# Patient Record
Sex: FEMALE | Race: WHITE | Employment: OTHER | URBAN - METROPOLITAN AREA
[De-identification: names, ages, dates, MRNs, and addresses within clinical notes are randomized per-mention and may not be internally consistent; named-entity substitution may affect disease eponyms.]

---

## 2019-04-03 ENCOUNTER — HOSPITAL ENCOUNTER (OUTPATIENT)
Age: 69
Discharge: HOME | End: 2019-04-05
Payer: MEDICARE

## 2019-04-03 ENCOUNTER — HOSPITAL ENCOUNTER (OUTPATIENT)
Dept: GENERAL RADIOLOGY | Age: 69
Discharge: HOME | End: 2019-04-05
Payer: MEDICARE

## 2019-04-03 DIAGNOSIS — R06.02 SHORTNESS OF BREATH: ICD-10-CM

## 2019-04-03 PROCEDURE — 71046 X-RAY EXAM CHEST 2 VIEWS: CPT

## 2019-08-19 ENCOUNTER — OFFICE VISIT (OUTPATIENT)
Dept: PULMONOLOGY | Age: 69
End: 2019-08-19
Payer: MEDICARE

## 2019-08-19 VITALS
HEIGHT: 62 IN | RESPIRATION RATE: 18 BRPM | OXYGEN SATURATION: 97 % | DIASTOLIC BLOOD PRESSURE: 60 MMHG | WEIGHT: 179 LBS | BODY MASS INDEX: 32.94 KG/M2 | SYSTOLIC BLOOD PRESSURE: 130 MMHG | HEART RATE: 85 BPM

## 2019-08-19 DIAGNOSIS — M34.9 SCLERODERMA (HCC): Primary | ICD-10-CM

## 2019-08-19 LAB
EXPIRATORY TIME-POST: NORMAL SEC
EXPIRATORY TIME: NORMAL SEC
FEF 25-75% %CHNG: NORMAL
FEF 25-75% %PRED-POST: NORMAL %
FEF 25-75% %PRED-PRE: NORMAL L/SEC
FEF 25-75% PRED: NORMAL L/SEC
FEF 25-75%-POST: NORMAL L/SEC
FEF 25-75%-PRE: NORMAL L/SEC
FEV1 %PRED-POST: 82 %
FEV1 %PRED-PRE: 79 %
FEV1 PRED: 2.04 L
FEV1-POST: 1.68 L
FEV1-PRE: 1.62 L
FEV1/FVC %PRED-POST: 100 %
FEV1/FVC %PRED-PRE: 91 %
FEV1/FVC PRED: 78 %
FEV1/FVC-POST: 78 %
FEV1/FVC-PRE: 72 %
FVC %PRED-POST: 81 L
FVC %PRED-PRE: 85 %
FVC PRED: 2.63 L
FVC-POST: 2.15 L
FVC-PRE: 2.26 L
PEF %PRED-POST: NORMAL %
PEF %PRED-PRE: NORMAL L/SEC
PEF PRED: NORMAL L/SEC
PEF%CHNG: NORMAL
PEF-POST: NORMAL L/SEC
PEF-PRE: NORMAL L/SEC

## 2019-08-19 PROCEDURE — 99204 OFFICE O/P NEW MOD 45 MIN: CPT | Performed by: INTERNAL MEDICINE

## 2019-08-19 PROCEDURE — 1123F ACP DISCUSS/DSCN MKR DOCD: CPT | Performed by: INTERNAL MEDICINE

## 2019-08-19 PROCEDURE — 4040F PNEUMOC VAC/ADMIN/RCVD: CPT | Performed by: INTERNAL MEDICINE

## 2019-08-19 PROCEDURE — 99203 OFFICE O/P NEW LOW 30 MIN: CPT | Performed by: INTERNAL MEDICINE

## 2019-08-19 PROCEDURE — 1090F PRES/ABSN URINE INCON ASSESS: CPT | Performed by: INTERNAL MEDICINE

## 2019-08-19 PROCEDURE — 94060 EVALUATION OF WHEEZING: CPT | Performed by: INTERNAL MEDICINE

## 2019-08-19 PROCEDURE — 3017F COLORECTAL CA SCREEN DOC REV: CPT | Performed by: INTERNAL MEDICINE

## 2019-08-19 PROCEDURE — G8400 PT W/DXA NO RESULTS DOC: HCPCS | Performed by: INTERNAL MEDICINE

## 2019-08-19 PROCEDURE — G8427 DOCREV CUR MEDS BY ELIG CLIN: HCPCS | Performed by: INTERNAL MEDICINE

## 2019-08-19 PROCEDURE — 1036F TOBACCO NON-USER: CPT | Performed by: INTERNAL MEDICINE

## 2019-08-19 PROCEDURE — G8598 ASA/ANTIPLAT THER USED: HCPCS | Performed by: INTERNAL MEDICINE

## 2019-08-19 PROCEDURE — G8417 CALC BMI ABV UP PARAM F/U: HCPCS | Performed by: INTERNAL MEDICINE

## 2019-08-19 ASSESSMENT — PULMONARY FUNCTION TESTS
FVC_PERCENT_PREDICTED_PRE: 85
FEV1_PERCENT_PREDICTED_PRE: 79
FEV1_PERCENT_PREDICTED_POST: 82
FEV1/FVC_PRE: 72
FEV1/FVC_PERCENT_PREDICTED_POST: 100
FEV1/FVC_PREDICTED: 78
FEV1/FVC_POST: 78
FVC_POST: 2.15
FEV1_POST: 1.68
FVC_PRE: 2.26
FVC_PREDICTED: 2.63
FVC_PERCENT_PREDICTED_POST: 81
FEV1_PREDICTED: 2.04
FEV1_PRE: 1.62
FEV1/FVC_PERCENT_PREDICTED_PRE: 91

## 2019-08-19 NOTE — PROGRESS NOTES
 JOINT REPLACEMENT Right     Knee       FAMILY HISTORY:   Lung cancer:No   DVT or PE  No     REVIEW OF SYSTEMS:  Constitutional: General health is good . There has been no weight changes. No fevers, fatigue or weakness. Head: Patient denies any history of trauma, convulsive disorder or syncope. Skin:  Patient denies history of changes in pigmentation, eruptions or pruritus. No easy bruising or bleeding. EENT: no nasal congestion   Cardiovascular ,No chest pain ,No edema ,  Respiration:SOB: + ,BRYANT :+  Gastrointestinal:No GI bleed ,no melena  ,no hematemesis    Musculoskeletal: no joint pain ,no swelling  Neurological:no , syncope. Denies twitching, convulsions, loss of consciousness or memory. Endocrine:  . No history of goiter, exophthalmos or dryness of skin. The patient has no history of diabetes. Hematopoietic:  No history of bleeding disorders or easy bruising. Rheumatic:  No connective tissue disease history or polyarthritis/inflammatory joint disease. PHYSICAL EXAMINATION:  Vitals:    08/19/19 1417   BP: 130/60   Pulse: 85   Resp: 18   SpO2: 97%     Constitutional: This is a well developed, well nourished 77y.o. year old female who is alert, oriented, cooperative and in no apparent distress. Head was normocephalic and atraumatic. EENT: Mallampati class :  Extraocular muscles intact. External canals are patent and no discharge was appreciated. Septum was midline,   mucosa was without erythema, exudates or cobblestoning. No thrush was noted. Neck: Supple without thyromegaly. No elevated JVP. Trachea was midline. No carotid bruits were auscultated. Respiratory: rhonchi     Cardiovascular: Regular without murmur, clicks, gallops or rubs. There is no left or right ventricular heave. Pulses:  Carotid, radial and femoral pulses were equally bilaterally. Abdomen: Slightly rounded and soft without organomegaly. No rebound, rigidity or guarding was appreciated. Lymphatic: No lymphadenopathy. Musculoskeletal: joint pain ,hip   Extremities: no edema     Skin:  Warm and dry. Good color, turgor and pigmentation. No lesions or scars. Neurological/Psychiatric: The patient's general behavior, level of consciousness, thought content and emotional status is normal.  Cranial nerves II-XII are intact. DATA:  FVC 2016 was 2.64 which 92 %   FEV1 1.63 which 95 %   FEV1/FVC 78      Today 8/19     FVC 2.26 which is 85 %  FEV /FVC 72  FEv1 1.62 which 79             IMPRESSION:    1-scleodrma  2-Possible restrictive lung disease   3-possible GEOFF  4-SOB             PLAN:     Patient with scleroderma, her ambulation in the office shows that her oxygen remained above 90, she has mild shortness of breath, we will do the further work-up to see if she developed any interstitial lung disease or pulmonary hypertension that can associate with scleroderma  We will proceed with the following  -2 d echo   - HRCT   -will need records from Dr Mary Beth Hernandez  _ hold for sleep study   _ check Eosinophilic ,sed rate and IGE     Flu and Pneumovax     Thank you for allowing me to participate in [facility]. I will keep following with you ,should you have any concerns ,please contact me at [phone]     Sincerely,        Melinda Amaya MD  Pulmonary & Critical Care Medicine     NOTE: This report was transcribed using voice recognition software. Every effort was made to ensure accuracy; however, inadvertent computerized transcription errors may be present.

## 2019-08-22 DIAGNOSIS — M34.9 SCLERODERMA (HCC): Primary | ICD-10-CM

## 2019-08-22 DIAGNOSIS — I27.89 OTHER SPECIFIED PULMONARY HEART DISEASES (HCC): ICD-10-CM

## 2019-08-27 ENCOUNTER — OFFICE VISIT (OUTPATIENT)
Dept: RHEUMATOLOGY | Age: 69
End: 2019-08-27
Payer: MEDICARE

## 2019-08-27 VITALS
WEIGHT: 189 LBS | HEIGHT: 62 IN | SYSTOLIC BLOOD PRESSURE: 120 MMHG | OXYGEN SATURATION: 97 % | DIASTOLIC BLOOD PRESSURE: 64 MMHG | HEART RATE: 74 BPM | BODY MASS INDEX: 34.78 KG/M2

## 2019-08-27 DIAGNOSIS — M34.9 SCLERODERMA (HCC): Primary | ICD-10-CM

## 2019-08-27 DIAGNOSIS — M16.10 HIP ARTHRITIS: ICD-10-CM

## 2019-08-27 DIAGNOSIS — I73.00 RAYNAUD'S DISEASE WITHOUT GANGRENE: ICD-10-CM

## 2019-08-27 PROCEDURE — 99214 OFFICE O/P EST MOD 30 MIN: CPT | Performed by: INTERNAL MEDICINE

## 2019-08-27 ASSESSMENT — ENCOUNTER SYMPTOMS: SHORTNESS OF BREATH: 1

## 2019-08-27 NOTE — PROGRESS NOTES
19    Name: Citlali Gonzalez  : 1950  Age: 77 y.o. Sex: female    Patient is seen at the request of Radha Baig MD    Patient presents for a rheumatology consultation regarding     Chief Complaint   Patient presents with   [facility] ED Follow-up    Hip Pain     both hip pain across back pain    Foot Pain     right foot       18:  Patient presents for follow-up. Overall she is doing well. Denies fever, chills, heartburn, chest pain,  shortness of breath, abdominal pain, nausea, vomiting, diarrhea. She reports calcinosis over the right knee. No active or draining lesions. Dr Jozef Sanchez has retired. She has not followed with new Pulmonologist yet. Exam. Oral mucosa with telangiectasias. Multiple telangiectasias affecting palms of both hands. Multiple  joints were examined and no active synovitis noted. She has chronic swelling of right lower extremity  along with right ankle swelling. No tenderness on palpation. Diffuse calcinosis over bilateral arms and  bilateral knees    19 :  Pain in hips   Following with Pulmonologist , Dr Spenser De La Cruz regarding shortness of breath and further work up for ILD       Vitals:    19 1453   BP: 120/64   Site: Left Upper Arm   Position: Sitting   Cuff Size: Large Adult   Pulse: 74   SpO2: 97%   Weight: 189 lb (85.7 kg)   Height: 5' 2\" (1.575 m)        Review of Systems   Respiratory: Positive for shortness of breath.            Current Outpatient Medications:     benazepril (LOTENSIN) 20 MG tablet, take 1 tablet by mouth twice a day, Disp: 60 tablet, Rfl: 0    levothyroxine (SYNTHROID) 100 MCG tablet, take 1 tablet by mouth once daily, Disp: 90 tablet, Rfl: 0    amLODIPine (NORVASC) 5 MG tablet, TAKE 1 TABLET BY MOUTH TWICE DAILY, Disp: 60 tablet, Rfl: 1    buPROPion (WELLBUTRIN XL) 150 MG extended release tablet, Take 1 tablet by mouth every morning, Disp: 90 tablet, Rfl: 1    busPIRone (BUSPAR) 10 MG tablet, take 1 tablet by mouth three times a day if needed

## 2019-09-04 ENCOUNTER — TELEPHONE (OUTPATIENT)
Dept: RHEUMATOLOGY | Age: 69
End: 2019-09-04

## 2019-09-06 ENCOUNTER — HOSPITAL ENCOUNTER (OUTPATIENT)
Age: 69
Discharge: HOME | End: 2019-09-06
Payer: MEDICARE

## 2019-09-06 DIAGNOSIS — M34.9 SCLERODERMA (HCC): ICD-10-CM

## 2019-09-06 LAB
EOSINOPHILS ABSOLUTE COUNT: 240 /UL (ref 50–250)
SEDIMENTATION RATE, ERYTHROCYTE: 45 MM/HR (ref 0–20)

## 2019-09-06 PROCEDURE — 82785 ASSAY OF IGE: CPT

## 2019-09-06 PROCEDURE — 36415 COLL VENOUS BLD VENIPUNCTURE: CPT

## 2019-09-06 PROCEDURE — 86003 ALLG SPEC IGE CRUDE XTRC EA: CPT

## 2019-09-06 PROCEDURE — 85048 AUTOMATED LEUKOCYTE COUNT: CPT

## 2019-09-06 PROCEDURE — 85651 RBC SED RATE NONAUTOMATED: CPT

## 2019-09-18 LAB
Lab: NORMAL
REPORT: NORMAL
THIS TEST SENT TO: NORMAL

## 2019-10-08 ENCOUNTER — TELEPHONE (OUTPATIENT)
Dept: PULMONOLOGY | Age: 69
End: 2019-10-08

## 2019-10-08 DIAGNOSIS — M34.9 SCLERODERMA (HCC): Primary | ICD-10-CM

## 2019-11-04 ENCOUNTER — HOSPITAL ENCOUNTER (OUTPATIENT)
Dept: NON INVASIVE DIAGNOSTICS | Age: 69
Discharge: HOME | End: 2019-11-04
Payer: MEDICARE

## 2019-11-04 ENCOUNTER — HOSPITAL ENCOUNTER (OUTPATIENT)
Dept: CT IMAGING | Age: 69
Discharge: HOME | End: 2019-11-06
Payer: MEDICARE

## 2019-11-04 ENCOUNTER — HOSPITAL ENCOUNTER (OUTPATIENT)
Age: 69
Discharge: HOME | End: 2019-11-04
Payer: MEDICARE

## 2019-11-04 DIAGNOSIS — M34.9 SCLERODERMA (HCC): ICD-10-CM

## 2019-11-04 DIAGNOSIS — I27.89 OTHER SPECIFIED PULMONARY HEART DISEASES (HCC): ICD-10-CM

## 2019-11-04 LAB
BUN BLDV-MCNC: 14 MG/DL (ref 8–23)
CREAT SERPL-MCNC: 0.8 MG/DL (ref 0.5–1)
GFR AFRICAN AMERICAN: >60
GFR NON-AFRICAN AMERICAN: >60 ML/MIN/1.73
LV EF: 65 %
LVEF MODALITY: NORMAL

## 2019-11-04 PROCEDURE — 36415 COLL VENOUS BLD VENIPUNCTURE: CPT

## 2019-11-04 PROCEDURE — 82565 ASSAY OF CREATININE: CPT

## 2019-11-04 PROCEDURE — 93306 TTE W/DOPPLER COMPLETE: CPT

## 2019-11-04 PROCEDURE — 84520 ASSAY OF UREA NITROGEN: CPT

## 2019-11-04 PROCEDURE — 71250 CT THORAX DX C-: CPT

## 2019-11-18 ENCOUNTER — OFFICE VISIT (OUTPATIENT)
Dept: PULMONOLOGY | Age: 69
End: 2019-11-18
Payer: MEDICARE

## 2019-11-18 VITALS — RESPIRATION RATE: 14 BRPM | HEART RATE: 78 BPM | SYSTOLIC BLOOD PRESSURE: 118 MMHG | DIASTOLIC BLOOD PRESSURE: 71 MMHG

## 2019-11-18 DIAGNOSIS — M34.9 SCLERODERMA (HCC): ICD-10-CM

## 2019-11-18 PROCEDURE — 99213 OFFICE O/P EST LOW 20 MIN: CPT | Performed by: INTERNAL MEDICINE

## 2019-11-18 PROCEDURE — 1123F ACP DISCUSS/DSCN MKR DOCD: CPT | Performed by: INTERNAL MEDICINE

## 2019-11-18 PROCEDURE — 4040F PNEUMOC VAC/ADMIN/RCVD: CPT | Performed by: INTERNAL MEDICINE

## 2019-11-18 PROCEDURE — G8417 CALC BMI ABV UP PARAM F/U: HCPCS | Performed by: INTERNAL MEDICINE

## 2019-11-18 PROCEDURE — G8598 ASA/ANTIPLAT THER USED: HCPCS | Performed by: INTERNAL MEDICINE

## 2019-11-18 PROCEDURE — 1036F TOBACCO NON-USER: CPT | Performed by: INTERNAL MEDICINE

## 2019-11-18 PROCEDURE — 1090F PRES/ABSN URINE INCON ASSESS: CPT | Performed by: INTERNAL MEDICINE

## 2019-11-18 PROCEDURE — G8400 PT W/DXA NO RESULTS DOC: HCPCS | Performed by: INTERNAL MEDICINE

## 2019-11-18 PROCEDURE — G8427 DOCREV CUR MEDS BY ELIG CLIN: HCPCS | Performed by: INTERNAL MEDICINE

## 2019-11-18 PROCEDURE — 99214 OFFICE O/P EST MOD 30 MIN: CPT | Performed by: INTERNAL MEDICINE

## 2019-11-18 PROCEDURE — 3017F COLORECTAL CA SCREEN DOC REV: CPT | Performed by: INTERNAL MEDICINE

## 2019-11-18 PROCEDURE — G8482 FLU IMMUNIZE ORDER/ADMIN: HCPCS | Performed by: INTERNAL MEDICINE

## 2020-11-16 ENCOUNTER — HOSPITAL ENCOUNTER (OUTPATIENT)
Age: 70
Discharge: HOME | End: 2020-11-18

## 2020-11-16 PROCEDURE — 88305 TISSUE EXAM BY PATHOLOGIST: CPT

## 2020-11-16 PROCEDURE — 88311 DECALCIFY TISSUE: CPT

## 2021-03-29 ENCOUNTER — OFFICE VISIT (OUTPATIENT)
Dept: CARDIOLOGY CLINIC | Age: 71
End: 2021-03-29
Payer: MEDICARE

## 2021-03-29 VITALS
BODY MASS INDEX: 30.93 KG/M2 | HEIGHT: 61 IN | WEIGHT: 163.8 LBS | HEART RATE: 69 BPM | RESPIRATION RATE: 16 BRPM | SYSTOLIC BLOOD PRESSURE: 128 MMHG | DIASTOLIC BLOOD PRESSURE: 68 MMHG

## 2021-03-29 DIAGNOSIS — E78.49 OTHER HYPERLIPIDEMIA: ICD-10-CM

## 2021-03-29 DIAGNOSIS — I10 ESSENTIAL HYPERTENSION: ICD-10-CM

## 2021-03-29 DIAGNOSIS — I25.10 CORONARY ARTERY DISEASE INVOLVING NATIVE CORONARY ARTERY OF NATIVE HEART WITHOUT ANGINA PECTORIS: ICD-10-CM

## 2021-03-29 PROCEDURE — 1090F PRES/ABSN URINE INCON ASSESS: CPT | Performed by: INTERNAL MEDICINE

## 2021-03-29 PROCEDURE — 4040F PNEUMOC VAC/ADMIN/RCVD: CPT | Performed by: INTERNAL MEDICINE

## 2021-03-29 PROCEDURE — G8417 CALC BMI ABV UP PARAM F/U: HCPCS | Performed by: INTERNAL MEDICINE

## 2021-03-29 PROCEDURE — G8427 DOCREV CUR MEDS BY ELIG CLIN: HCPCS | Performed by: INTERNAL MEDICINE

## 2021-03-29 PROCEDURE — 93000 ELECTROCARDIOGRAM COMPLETE: CPT | Performed by: INTERNAL MEDICINE

## 2021-03-29 PROCEDURE — G8400 PT W/DXA NO RESULTS DOC: HCPCS | Performed by: INTERNAL MEDICINE

## 2021-03-29 PROCEDURE — G8484 FLU IMMUNIZE NO ADMIN: HCPCS | Performed by: INTERNAL MEDICINE

## 2021-03-29 PROCEDURE — 1123F ACP DISCUSS/DSCN MKR DOCD: CPT | Performed by: INTERNAL MEDICINE

## 2021-03-29 PROCEDURE — 3017F COLORECTAL CA SCREEN DOC REV: CPT | Performed by: INTERNAL MEDICINE

## 2021-03-29 PROCEDURE — 99204 OFFICE O/P NEW MOD 45 MIN: CPT | Performed by: INTERNAL MEDICINE

## 2021-03-29 PROCEDURE — 1036F TOBACCO NON-USER: CPT | Performed by: INTERNAL MEDICINE

## 2021-03-29 RX ORDER — ATORVASTATIN CALCIUM 10 MG/1
TABLET, FILM COATED ORAL
COMMUNITY
Start: 2021-01-22

## 2021-03-29 RX ORDER — BENAZEPRIL HYDROCHLORIDE 40 MG/1
TABLET, FILM COATED ORAL
COMMUNITY
Start: 2021-03-20

## 2021-03-29 NOTE — PROGRESS NOTES
Chief Complaint   Patient presents with    Coronary Artery Disease    Hyperlipidemia       Patient Active Problem List    Diagnosis Date Noted    Coronary artery disease involving native coronary artery of native heart without angina pectoris 03/29/2021     Overview Note:     A. STEMI 2008 : AYSHA in prox RCA  B. Echo 2019 normal EF      Other hyperlipidemia 03/29/2021    Hip arthritis 08/27/2019    Depression 02/29/2016    Vitamin D deficiency 02/29/2016    Scler[address]a ([facility] [address]     Raynaud's disease     Hypertension     Hypothyroidism        Current Outpatient Medications   Medication Sig Dispense Refill    atorvastatin (LIPITOR) 10 MG tablet       benazepril (LOTENSIN) 40 MG tablet       carvedilol (COREG) 25 MG tablet Take 1 tablet by mouth 2 times daily 180 tablet 0    minocycline (MINOCIN;DYNACIN) 100 MG capsule TAKE 1 CAPSULE BY MOUTH TWICE DAILY 180 capsule 0    amLODIPine (NORVASC) 5 MG tablet TAKE 1 TABLET BY MOUTH TWICE DAILY 60 tablet 0    buPROPion (WELLBUTRIN XL) 150 MG extended release tablet Take 1 tablet by mouth every morning 90 tablet 1    busPIRone (BUSPAR) 10 MG tablet TAKE 1 TABLET BY MOUTH 3 TIMES A DAY IF NEEDED FOR ANXIETY 90 tablet 0    levothyroxine (SYNTHROID) 100 MCG tablet take 1 tablet by mouth once daily 90 tablet 1    NITROSTAT 0.4 MG SL tablet USE AS DIRECTED 25 tablet 0    triamcinolone (KENALOG) 0.1 % ointment APPLY BID TO RASH 1 Tube 3      30 tablet 3    aspirin 325 MG EC tablet Take 325 mg by mouth every 6 hours as needed for Pain       No current facility-administered medications for this visit.          Allergies   Allergen Reactions    Aleve [Naproxen Sodium]     Diazepam     Furosemide     Sulfa Antibiotics        Vitals:    03/29/21 1017   BP: 128/68   Pulse: 69   Resp: 16   Weight: 163 lb 12.8 oz (74.3 kg)   Height: 5' 1\" (1.549 m)       Social History     Socioeconomic History    Marital status:      Spouse name: Not on file    Number of heart beat, near-syncope, orthopnea, palpitations, paroxysmal nocturnal dyspnea, syncope and tachypnea. Not taking ASA,  Did not reduce amlodipine dose to combat lower ext edema as advised by PCP. Had a rash with furosemide  Her  is an invalid and she does all the chores and shopping. Review of Systems:   Heart: as above   Lungs: as above   Eyes: denies changes in vision or discharge. Ears: denies changes in hearing or pain. Nose: denies epistaxis or masses   Throat: denies sore throat or trouble swallowing. Neuro: denies numbness, tingling, tremors. Skin: denies rashes or itching. : denies hematuria, dysuria   GI: denies vomiting, diarrhea   Psych: denies mood changed, anxiety, depression. all others negative. Physical Exam   /68   Pulse 69   Resp 16   Ht 5' 1\" (1.549 m)   Wt 163 lb 12.8 oz (74.3 kg)   BMI 30.95 kg/m²   Constitutional: Oriented to person, place, and time. Slow of speech and movement. Well-developed and well-nourished. No distress. Head: Normocephalic and atraumatic. Eyes: EOM are normal. Pupils are equal, round, and reactive to light. Neck: Normal range of motion. Neck supple. No hepatojugular reflux and no JVD present. Carotid bruit is not present. No tracheal deviation present. No thyromegaly present. Cardiovascular: Normal rate, regular rhythm, normal heart sounds and intact distal pulses. Exam reveals no gallop and no friction rub. No murmur heard. Pulmonary/Chest: Effort normal and breath sounds normal. No respiratory distress. No wheezes. No rales. No tenderness. Abdominal: Soft. Bowel sounds are normal. No distension and no mass. No tenderness. No rebound and no guarding. Musculoskeletal:  1+ bilateral  edema and no tenderness. Neurological: Alert and oriented to person, place, and time. Skin: Skin is warm and dry. No rash noted. Not diaphoretic. No erythema. Psychiatric: Normal mood and affect.  Behavior is normal.     EKG: normal sinus rhythm, rate 69 , axis +81, remote anterior MI pattern, improved from 2017. ASSESSMENT AND PLAN:  Patient Active Problem List   Diagnosis    Hypothyroidism    Scleroderma ([address]    Raynaud's disease    Hypertension    Depression    Vitamin D deficiency    Hip arthritis    Coronary artery disease involving native coronary artery of native heart without angina pectoris    Other hyperlipidemia     Elderly female with hx of remote RCA infarct with no recurrence of symptoms. Echo in 2019 showed normal LV function and no PHTN (has connective tissue disease)  Ekg, if anything, shows improved anterior forces  Exam unremarkable for valvular heart disease or chf  Her pedal edema is possibly due to amlodipine, and I re-emphasized she do what PCP asked her to do, that being reduce dose to 5 mg  She could even eliminate it if need be. She claims reaction to furosemide (has sulfa allergy). I have never seen that before, but it is a real phenomenom - can use ethacrynic acid as diuretic if need be  Asked to take low dose ASA qd. No testing needed        The patient was educated as to the symptoms that would require a prompt return to our office. Return visit in 1 year.     Marylu Cowden, M.D  [facility] Cardiology

## 2021-05-13 ENCOUNTER — HOSPITAL ENCOUNTER (OUTPATIENT)
Age: 71
Discharge: HOME | End: 2021-05-13
Payer: MEDICARE

## 2021-05-13 ENCOUNTER — HOSPITAL ENCOUNTER (OUTPATIENT)
Dept: CT IMAGING | Age: 71
Discharge: HOME | End: 2021-05-15
Payer: MEDICARE

## 2021-05-13 DIAGNOSIS — M34.1 CR(E)ST SYNDROME (HCC): ICD-10-CM

## 2021-05-13 DIAGNOSIS — M34.1 CRST SYNDROME (HCC): ICD-10-CM

## 2021-05-13 LAB
ANION GAP SERPL CALCULATED.3IONS-SCNC: 12 MMOL/L (ref 7–16)
BUN BLDV-MCNC: 13 MG/DL (ref 6–23)
CALCIUM SERPL-MCNC: 9 MG/DL (ref 8.6–10.2)
CHLORIDE BLD-SCNC: 106 MMOL/L (ref 98–107)
CO2: 25 MMOL/L (ref 22–29)
CREAT SERPL-MCNC: 0.6 MG/DL (ref 0.5–1)
GFR AFRICAN AMERICAN: >60
GFR NON-AFRICAN AMERICAN: >60 ML/MIN/1.73
GLUCOSE BLD-MCNC: 86 MG/DL (ref 74–99)
POTASSIUM SERPL-SCNC: 4.5 MMOL/L (ref 3.5–5)
SODIUM BLD-SCNC: 143 MMOL/L (ref 132–146)

## 2021-05-13 PROCEDURE — 80048 BASIC METABOLIC PNL TOTAL CA: CPT

## 2021-05-13 PROCEDURE — 71250 CT THORAX DX C-: CPT

## 2021-05-13 PROCEDURE — 71260 CT THORAX DX C+: CPT

## 2021-05-13 PROCEDURE — 6360000004 HC RX CONTRAST MEDICATION: Performed by: RADIOLOGY

## 2021-05-13 PROCEDURE — 36415 COLL VENOUS BLD VENIPUNCTURE: CPT

## 2021-05-13 PROCEDURE — 2580000003 HC RX 258: Performed by: RADIOLOGY

## 2021-05-13 RX ORDER — SODIUM CHLORIDE 0.9 % (FLUSH) 0.9 %
10 SYRINGE (ML) INJECTION PRN
Status: DISCONTINUED | OUTPATIENT
Start: 2021-05-13 | End: 2021-05-16 | Stop reason: HOSPADM

## 2021-05-13 RX ADMIN — Medication 10 ML: at 15:11

## 2021-05-13 RX ADMIN — IOPAMIDOL 90 ML: 755 INJECTION, SOLUTION INTRAVENOUS at 15:10

## 2021-07-15 ENCOUNTER — TELEPHONE (OUTPATIENT)
Dept: ADMINISTRATIVE | Age: 71
End: 2021-07-15

## 2021-07-15 NOTE — TELEPHONE ENCOUNTER
Matti Johnson states pt would like an appt to see Dr. Dianne Paulson.  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 She was seen recently by Dr. Arleen Ramos. Pt was seen by Dr. Dianne Paulson in the distant past (2017) and has been taking care of her  with Alzheimer's and her son is now helping her keep track of her medical appts.  Please call Matti Johnson if pt can be scheduled with Dr. Dianne Paulson.

## 2021-07-19 DIAGNOSIS — R06.02 SOB (SHORTNESS OF BREATH): Primary | ICD-10-CM

## 2021-08-11 ENCOUNTER — HOSPITAL ENCOUNTER (OUTPATIENT)
Dept: CARDIOLOGY | Age: 71
Discharge: HOME | End: 2021-08-11
Payer: MEDICARE

## 2021-08-11 DIAGNOSIS — R06.02 SOB (SHORTNESS OF BREATH): ICD-10-CM

## 2021-08-11 LAB
LV EF: 60 %
LVEF MODALITY: NORMAL

## 2021-08-11 PROCEDURE — 93306 TTE W/DOPPLER COMPLETE: CPT | Performed by: PSYCHIATRY & NEUROLOGY

## 2021-08-13 ENCOUNTER — TELEPHONE (OUTPATIENT)
Dept: CARDIOLOGY CLINIC | Age: 71
End: 2021-08-13

## 2021-08-13 NOTE — TELEPHONE ENCOUNTER
----- Message from Constanza Penn MD sent at 8/13/2021 12:10 PM EDT -----   Normal ventricular function. Mild mitral regurgitation.   Continue current care

## 2021-08-27 ENCOUNTER — OFFICE VISIT (OUTPATIENT)
Dept: CARDIOLOGY CLINIC | Age: 71
End: 2021-08-27
Payer: MEDICARE

## 2021-08-27 VITALS
WEIGHT: 153.7 LBS | RESPIRATION RATE: 16 BRPM | HEART RATE: 66 BPM | BODY MASS INDEX: 28.29 KG/M2 | DIASTOLIC BLOOD PRESSURE: 62 MMHG | HEIGHT: 62 IN | SYSTOLIC BLOOD PRESSURE: 106 MMHG

## 2021-08-27 DIAGNOSIS — I25.10 CORONARY ARTERY DISEASE INVOLVING NATIVE CORONARY ARTERY OF NATIVE HEART WITHOUT ANGINA PECTORIS: ICD-10-CM

## 2021-08-27 DIAGNOSIS — I10 ESSENTIAL HYPERTENSION: Primary | ICD-10-CM

## 2021-08-27 DIAGNOSIS — E78.49 OTHER HYPERLIPIDEMIA: ICD-10-CM

## 2021-08-27 DIAGNOSIS — R06.02 SOB (SHORTNESS OF BREATH): ICD-10-CM

## 2021-08-27 PROCEDURE — 3017F COLORECTAL CA SCREEN DOC REV: CPT | Performed by: INTERNAL MEDICINE

## 2021-08-27 PROCEDURE — 93000 ELECTROCARDIOGRAM COMPLETE: CPT | Performed by: INTERNAL MEDICINE

## 2021-08-27 PROCEDURE — 4040F PNEUMOC VAC/ADMIN/RCVD: CPT | Performed by: INTERNAL MEDICINE

## 2021-08-27 PROCEDURE — 1123F ACP DISCUSS/DSCN MKR DOCD: CPT | Performed by: INTERNAL MEDICINE

## 2021-08-27 PROCEDURE — 1036F TOBACCO NON-USER: CPT | Performed by: INTERNAL MEDICINE

## 2021-08-27 PROCEDURE — G8400 PT W/DXA NO RESULTS DOC: HCPCS | Performed by: INTERNAL MEDICINE

## 2021-08-27 PROCEDURE — G8427 DOCREV CUR MEDS BY ELIG CLIN: HCPCS | Performed by: INTERNAL MEDICINE

## 2021-08-27 PROCEDURE — G8417 CALC BMI ABV UP PARAM F/U: HCPCS | Performed by: INTERNAL MEDICINE

## 2021-08-27 PROCEDURE — 99214 OFFICE O/P EST MOD 30 MIN: CPT | Performed by: INTERNAL MEDICINE

## 2021-08-27 PROCEDURE — 1090F PRES/ABSN URINE INCON ASSESS: CPT | Performed by: INTERNAL MEDICINE

## 2021-08-27 NOTE — PROGRESS NOTES
Date of Service: 8/27/2021    CHIEF COMPLAINT: Follow-up for CAD    HPI:  Previously followed by Dr. Cassie Patricio. She requested to follow with me since her  follows with me as well (she established care with me in 11/2016). No new cardiac complaints since last cardiology evaluation. Negative Lexiscan nuclear stress test in 3/2016. She maintains, \"I feel good\" (although she complained of CP to her PCP recently). She denies recent chest pain, SOB, palpitations, PND, orthopnea, or syncope. ++increased stress at home ( in Hospice -- recently diagnosed with liver cancer). Patient with a prior history of CAD having had an acute inferior MI 2008 with stent. She was noted to have an 85% circumflex medially managed (small caliber) and 35-40% LAD. Stress 6/21/12 was negative with LVEF 67%. She also has hypertension, hyperlipidemia, carotid disease, hypothyroidism, scleroderma and raynaud.     Review of Systems:  Cardiac: As per HPI  General: No fever, chills  Pulmonary: As per HPI  HEENT: No visual disturbances, difficult swallowing  GI: No nausea, vomiting  : No dysuria, hematuria  Endocrine: +hypothyroidism, no DM  Musculoskeletal: MILLER x 4, no focal motor deficits  Skin: Intact, no rashes  Neuro: No headache, seizures  Psych: Currently with no depression, anxiety    Past Medical History:   Diagnosis Date    Anxiety     Carotid artery occlusion     Chest pressure     Depression     Hypertension     Systemic Arterial hypertension    Hypertriglyceridemia     Hypothyroidism     Left ventricular systolic dysfunction     MI (myocardial infarction) ([address] 10/13/2008    Acute Inferoposterior amd lateral MI    Obesity     Raynaud's disease     Scleroderma ([address]     Vitamin D deficiency 2/29/2016       Patient Active Problem List   Diagnosis    Hypothyroidism    Scleroderma ([address]    Raynaud's disease    Hypertension    Depression    Vitamin D deficiency    Hip arthritis    Coronary artery disease involving native coronary artery of native heart without angina pectoris    Other hyperlipidemia     Allergies   Allergen Reactions    Aleve [Naproxen Sodium]     Diazepam     Furosemide     Sulfa Antibiotics      Current Outpatient Medications   Medication Sig Dispense Refill    atorvastatin (LIPITOR) 10 MG tablet       benazepril (LOTENSIN) 40 MG tablet       carvedilol (COREG) 25 MG tablet Take 1 tablet by mouth 2 times daily 180 tablet 0    minocycline (MINOCIN;DYNACIN) 100 MG capsule TAKE 1 CAPSULE BY MOUTH TWICE DAILY 180 capsule 0    amLODIPine (NORVASC) 5 MG tablet TAKE 1 TABLET BY MOUTH TWICE DAILY 60 tablet 0    buPROPion (WELLBUTRIN XL) 150 MG extended release tablet Take 1 tablet by mouth every morning 90 tablet 1    busPIRone (BUSPAR) 10 MG tablet TAKE 1 TABLET BY MOUTH 3 TIMES A DAY IF NEEDED FOR ANXIETY 90 tablet 0    levothyroxine (SYNTHROID) 100 MCG tablet take 1 tablet by mouth once daily 90 tablet 1    NITROSTAT 0.4 MG SL tablet USE AS DIRECTED 25 tablet 0    triamcinolone (KENALOG) 0.1 % ointment APPLY BID TO RASH 1 Tube 3    furosemide (LASIX) 20 MG tablet Take 1 tablet by mouth daily (Patient not taking: Reported on 3/29/2021) 30 tablet 3    aspirin 325 MG EC tablet Take 325 mg by mouth every 6 hours as needed for Pain       No current facility-administered medications for this visit.      Social History     Socioeconomic History    Marital status:      Spouse name: Not on file    Number of children: Not on file    Years of education: Not on file    Highest education level: Not on file   Occupational History    Not on file   Tobacco Use    Smoking status: Former Smoker     Packs/day: 1.00     Years: 20.00     Pack years: 20.00     Types: Cigarettes     Quit date: 2000     Years since quittin.6    Smokeless tobacco: Never Used    Tobacco comment: quit smoking over 25 yrs ago   Substance and Sexual Activity    Alcohol use: Yes     Comment: daily--one glass  Drug use: No    Sexual activity: Not on file   Other Topics Concern    Not on file   Social History Narrative    Not on file     Social Determinants of Health     Financial Resource Strain:     Difficulty of Paying Living Expenses:    Food Insecurity:     Worried About Running Out of Food in the Last Year:     [address] in the Last Year:    Transportation Needs:     Lack of Transportation (Medical):  Lack of Transportation (Non-Medical):    Physical Activity:     Days of Exercise per Week:     Minutes of Exercise per Session:    Stress:     Feeling of Stress :    Social Connections:     Frequency of Communication with Friends and Family:     Frequency of Social Gatherings with Friends and Family:     Attends Zoroastrian Services:     Active Member of Clubs or Organizations:     Attends Club or Organization Meetings:     Marital Status:    Intimate Partner Violence:     Fear of Current or Ex-Partner:     Emotionally Abused:     Physically Abused:     Sexually Abused:        Family History   Problem Relation Age of Onset    Heart Attack Father     Early Death Father     Heart Disease Brother     Heart Disease Brother     Heart Disease Brother      Physical Exam   /62   Pulse 66   Resp 16   Ht 5' 2\" (1.575 m)   Wt 153 lb 11.2 oz (69.7 kg)   BMI 28.11 kg/m² Appearance: Awake, alert, no acute respiratory distress  Skin: Intact, no rash  Head: Normocephalic, atraumatic  Eyes: EOMI, no conjunctival erythema  ENMT: No pharyngeal erythema, MMM, no rhinorrheas  Neck: Supple, no elevated JVP, no carotid bruits  Lungs: Clear to auscultation bilaterally. No wheezes, rales, or rhonchi.   Cardiac: Regular rate and rhythm, +S1S2, no murmurs apparent  Abdomen: Soft, nontender, non-distended, +bowel sounds  Extremities: Moves all extremities x 4, no lower extremity edema  Neurologic: No focal motor deficits apparent, normal mood and affect, alert and oriented x 3    EKG: SR, rate 66, PRWP, STAR's    ASSESSMENT AND PLAN:  Patient Active Problem List   Diagnosis    Hypothyroidism    Scleroderma ([address]    Raynaud's disease    Hypertension    Depression    Vitamin D deficiency    Hip arthritis    Coronary artery disease involving native coronary artery of native heart without angina pectoris    Other hyperlipidemia     Echocardiogram: 11/4/2019 (Dr. Manfred Ramirez)   Left ventricular internal dimensions were normal in diastole and systole. No regional wall motion abnormalities seen. Normal left ventricular ejection fraction. Ejection fraction is visually estimated at 65%. Normal right ventricular size and function. There is doppler evidence of stage I diastolic dysfunction. The left atrium is mildly dilated. The aortic valve appears mildly sclerotic. Echocardiogram: 8/11/2021 (Dr. Tyshawn Landers)   Normal left ventricular systolic function. Ejection fraction is visually estimated at 60%. Normal right ventricular size and function (TAPSE 2.1 cm). There is doppler evidence of stage I diastolic dysfunction. Mild mitral regurgitation. Physiologic and/or trace tricuspid regurgitation. Unable to estimate PASP due to incomplete tricuspid regurgitation   envelope. Mild pulmonic regurgitation. 1. CAD (as outlined above) -- most recent stress test in 3/2016 (Elisabeth Mcgill -- negative, EF 67%)  2. HTN -- controlled, BP today 106/62  3. HLD -- on statin  4. Carotid artery disease  5. Hypothyroidism - on synthroid  6.  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) Cardiology

## 2021-09-09 ENCOUNTER — TELEPHONE (OUTPATIENT)
Dept: PULMONOLOGY | Age: 71
End: 2021-09-09

## 2021-09-09 NOTE — TELEPHONE ENCOUNTER
Left message stating that dr is working in ICU and can not make appt and nurse will call to reschedule appt

## 2022-05-06 ENCOUNTER — TELEPHONE (OUTPATIENT)
Dept: CARDIOLOGY CLINIC | Age: 72
End: 2022-05-06

## 2022-05-06 NOTE — TELEPHONE ENCOUNTER
Patient needs cardiac clearance for cataract surgery by [address] at [facility] under [address]. Spoke with patient. Patient denies any chest pain, shortness of breath or palpitations since last visit in August 2021. Patient is able to complete all activities of daily living, including; climbing one flight of stairs and walking one block without cardiac symptoms. Patient on Aspirin. Please advise.

## 2022-05-06 NOTE — TELEPHONE ENCOUNTER
Patient notified (via voicemail) of Dr. Cassie Cobian risk assessment for surgery. Note faxed to [address] [phone].

## 2022-10-04 ENCOUNTER — TELEPHONE (OUTPATIENT)
Dept: CARDIOLOGY | Age: 72
End: 2022-10-04

## 2022-10-05 ENCOUNTER — TELEPHONE (OUTPATIENT)
Dept: CARDIOLOGY | Age: 72
End: 2022-10-05

## 2022-10-05 NOTE — TELEPHONE ENCOUNTER
Called patient and left message to reschedule echo that was scheduled for this morning. Patient was a no show.     Electronically signed by Araceli Mcgill on 10/5/2022 at 11:25 AM

## 2022-10-13 ENCOUNTER — TELEPHONE (OUTPATIENT)
Dept: CARDIOLOGY | Age: 72
End: 2022-10-13

## 2022-10-13 NOTE — TELEPHONE ENCOUNTER
RETURNING PATIENT CALL AND LEFT MESSAGED TO RESCHEDULE HER ECHO APPT.     Electronically signed by Eliseo Barr on 10/13/2022 at 10:39 AM

## 2022-10-21 ENCOUNTER — HOSPITAL ENCOUNTER (OUTPATIENT)
Dept: CARDIOLOGY | Age: 72
Discharge: HOME | End: 2022-10-21
Payer: MEDICARE

## 2022-10-21 PROCEDURE — 93306 TTE W/DOPPLER COMPLETE: CPT

## 2023-06-14 ENCOUNTER — TELEPHONE (OUTPATIENT)
Dept: ORTHOPEDIC SURGERY | Age: 73
End: 2023-06-14